# Patient Record
(demographics unavailable — no encounter records)

---
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
## 2025-05-02 NOTE — DATA REVIEWED
[de-identified] : My interpretation of imaging done on 04/30/2025  AP/Lateral/Oblique views of the right elbow IN CAST shows a displaced lateral condyle fracture with 4 mm of diastasis.   AP/Lateral views of the left shoulder shows a left humeral neck fracture with fragmentation involving the anterior and inferior aspect of the glenoid. Humerus appears to within normal limits.   CT LEFT SHOULDER done on 04/26/2025 at [facility] [place]: IMPRESSION: Status post reduction of a Salter-Olsen I fracture with alignment near anatomic. Additional acute comminuted fracture involving the glenoid and fracture fragments along the proximal humeral diaphysis.

## 2025-05-02 NOTE — PHYSICAL EXAM
[FreeTextEntry1] : General: Healthy appearing 13-year-old male child.  Psych: The patient is awake, alert and in no acute distress.  HEENT: Normal appearing eyes, lips, ears, nose.  Integumentary: Skin in warm, pink, well perfused Chest: Good respiratory effort with no audible wheezing without use of a stethoscope. Musculoskeletal:  RUE: Right long arm cast is well fitting.  The padding is intact with no signs of skin irritation.  No pressure sores or abrasions noted around the cast.  Neurologically intact with brisk capillary refill in all five digits.  Digits are moving freely.  Neurovascularly intact. Sensations intact.		  LUE: In sling Limited exam due to injury.  Neurologically intact with brisk capillary refill in all five digits.  Digits are moving freely.  Neurovascularly intact. Sensations intact.

## 2025-05-02 NOTE — REASON FOR VISIT
[Follow Up] : a follow up visit [Patient] : patient [Father] : father [FreeTextEntry1] : new left shoulder fracture and right elbow fracture, DOI: 04/26/2025

## 2025-05-02 NOTE — DATA REVIEWED
[de-identified] : My interpretation of imaging done on 04/30/2025  AP/Lateral/Oblique views of the right elbow IN CAST shows a displaced lateral condyle fracture with 4 mm of diastasis.   AP/Lateral views of the left shoulder shows a left humeral neck fracture with fragmentation involving the anterior and inferior aspect of the glenoid. Humerus appears to within normal limits.   CT LEFT SHOULDER done on 04/26/2025 [place] [facility] [place]: IMPRESSION: Status post reduction of a Salter-Olsen I fracture with alignment near anatomic. Additional acute comminuted fracture involving the glenoid and fracture fragments along the proximal humeral diaphysis.
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
## 2025-05-02 NOTE — REVIEW OF SYSTEMS
[Change in Activity] : change in activity [Joint Pains] : arthralgias [Joint Swelling] : joint swelling  [Fever Above 102] : no fever [Malaise] : no malaise [Nosebleeds] : no epistaxis [Wheezing] : no wheezing [Cough] : no cough [Shortness of Breath] : no shortness of breath [Vomiting] : no vomiting [Limping] : no limping [Back Pain] : ~T no back pain
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
## 2025-07-01 NOTE — DATA REVIEWED
[de-identified] : Imaging: My interpretation and review of images taken today, 7/1/25, in office: X-rays taken today demonstrate now healed lateral condyle with hardware in place, evidence of bridging bone at fracture site. The alignment is overall acceptable. Hand XRs show open distal phalanx physis, indicating Risser 1.   My interpretation and review of images taken today, 6/3/25, in office: X-rays taken today demonstrate lateral condyle fracture with hardware in place. The alignment is overall acceptable.  My interpretation and review of images taken today, 6/3/25, in office: X-rays taken OUT OF cast today demonstrate lateral condyle fracture with hardware in place. The alignment is overall acceptable.  My interpretation and review of images taken today, 05/20/2025, in office: X-rays taken in cast today demonstrate lateral condyle fracture with hardware in place. The alignment is overall acceptable.
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
## 2025-07-01 NOTE — ASSESSMENT
[FreeTextEntry1] : Sam is a 12 yo male now s/p Right elbow open reduction and internal fixation. DOS 05/12/2025, 7 weeks ago. Postoperatively, the patient is doing well, has excellent pain control and is showing no signs of infection. He is reporting some R elbow stiffness but is increasing his activity levels appropriately.   PLAN - Patient can return to full activity now that R elbow and L proximal humerus fractures are now 7 weeks out, there is evidence of bony union and he is complaining of no pain - Continue PT 1-2x/week to improve stiffness and increase strength - Follow up in 3 months for OR planning for [facility]. Family would like hardware to be removed around Decemeber time of this year.  Parent served as the primary historian regarding the above information for this visit to corroborate the patient's history.  Documented by Regina Feliciano MD for Dr. Mcdonald on 7/1/2025.   I, Carlitos Mcdonald MD, personally saw and evaluated the patient and developed the plan as documented above. I concur or have edited the note as appropriate.

## 2025-07-01 NOTE — DATA REVIEWED
[de-identified] : Imaging: My interpretation and review of images taken today, 7/1/25, in office: X-rays taken today demonstrate now healed lateral condyle with hardware in place, evidence of bridging bone at fracture site. The alignment is overall acceptable. Hand XRs show open distal phalanx physis, indicating Risser 1.   My interpretation and review of images taken today, 6/3/25, in office: X-rays taken today demonstrate lateral condyle fracture with hardware in place. The alignment is overall acceptable.  My interpretation and review of images taken today, 6/3/25, in office: X-rays taken OUT OF cast today demonstrate lateral condyle fracture with hardware in place. The alignment is overall acceptable.  My interpretation and review of images taken today, 05/20/2025, in office: X-rays taken in cast today demonstrate lateral condyle fracture with hardware in place. The alignment is overall acceptable.

## 2025-07-01 NOTE — PHYSICAL EXAM
[FreeTextEntry1] : Healthy appearing 13 year-old child. Awake, alert, in no acute distress. Pleasant and cooperative.  Right upper extremity: Incision is well healed Elbow NTTP ROM R elbow approximately 30 degrees-110 degrees  Actively wiggling all digits SILT. Brisk capillary refill in all digits.  Left upper extremity: Skin intact Shoulder NTTP Full IR/ER/FF/abduction L shoulder  ROM R elbow approximately 30 degrees-110 degrees  Actively wiggling all digits SILT. Brisk capillary refill in all digits.

## 2025-07-01 NOTE — END OF VISIT
[] : Resident [FreeTextEntry3] : I, Carlitos Mcdonald MD, personally saw and evaluated the patient and developed the plan as documented above. I concur or have edited the note as appropriate.

## 2025-07-01 NOTE — ASSESSMENT
[FreeTextEntry1] : Sam is a 14 yo male now s/p Right elbow open reduction and internal fixation. DOS 05/12/2025, 7 weeks ago. Postoperatively, the patient is doing well, has excellent pain control and is showing no signs of infection. He is reporting some R elbow stiffness but is increasing his activity levels appropriately.   PLAN - Patient can return to full activity now that R elbow and L proximal humerus fractures are now 7 weeks out, there is evidence of bony union and he is complaining of no pain - Continue PT 1-2x/week to improve stiffness and increase strength - Follow up in 3 months for OR planning for [facility]. Family would like hardware to be removed around Decemeber time of this year.  Parent served as the primary historian regarding the above information for this visit to corroborate the patient's history.  Documented by Regina Feliciano MD for Dr. Mcdonald on 7/1/2025.   I, Carlitos Mcdonald MD, personally saw and evaluated the patient and developed the plan as documented above. I concur or have edited the note as appropriate.